# Patient Record
(demographics unavailable — no encounter records)

---

## 2025-05-14 NOTE — HISTORY OF PRESENT ILLNESS
[10] : 10 [8] : 8 [Dull/Aching] : dull/aching [Localized] : localized [Sharp] : sharp [Nothing helps with pain getting better] : Nothing helps with pain getting better [Exercising] : exercising [Full time] : Work status: full time [Sudden] : sudden [Constant] : constant [Work] : work [] : no [de-identified] : 2020 [de-identified] :

## 2025-05-14 NOTE — ASSESSMENT
[FreeTextEntry1] :  presenting with 2-week history of left shoulder pain while lifting heavy object work.  He is unsure what the weight of that object was but he said it took 5 workers to lift it.  He felt immediate discomfort in the left shoulder and although he has been working ever since he has had difficulty with lifting reaching as well as sleeping on the affected side.  He reports a prior injury to the shoulder in the year 2020 treated nonoperatively and reports no issues until this recent injury.  He is weak with rotator cuff testing.  His x-rays show mild degenerative changes but no acute fracture.  An MRI is warranted to assess for rotator cuff tear.  Advised care with continued lifting at work.  I have started him on ibuprofen 600 mg prescription.  Prognosis uncertain without the MRI.  Due to the nature of his work if he does have a significant tear in the rotator cuff he may need to have this repaired arthroscopically.  The patient's current medication management of their orthopedic diagnosis was reviewed today: There is a moderate risk of morbidity with further treatment, especially from use of prescription strength medications and possible side effects of these medications which include upset stomach with oral medications, skin reactions to topical medications and cardiac/renal/diabetes issues with long term use.

## 2025-05-14 NOTE — IMAGING
[Left] : left shoulder [There are no fractures, subluxations or dislocations. No significant abnormalities are seen] : There are no fractures, subluxations or dislocations. No significant abnormalities are seen [Type 2 acromion] : Type 2 acromion [AC Joint Arthrosis] : AC Joint Arthrosis [de-identified] : Shoulder Exam Inspection: No swelling, no ecchymosis, no manuel deformity Palpation: Tenderness to palpation over greater tuberosity and anterior shoulder Range of Motion:  Passive FF: 160; ER: 80: IR: 50; ER at the side 50 Strength: 4-/5 supraspinatus, infraspinatus and 5-/5 subscapularis; there is pain with strength testing Special testing: Positive Doyle test, positive impingement sign; Speeds and yergason positive Neuro: Motor and sensory intact distally